# Patient Record
Sex: MALE | Race: WHITE
[De-identification: names, ages, dates, MRNs, and addresses within clinical notes are randomized per-mention and may not be internally consistent; named-entity substitution may affect disease eponyms.]

---

## 2018-03-20 ENCOUNTER — HOSPITAL ENCOUNTER (OUTPATIENT)
Dept: HOSPITAL 84 - D.CATH | Age: 63
Discharge: HOME | End: 2018-03-20
Attending: INTERNAL MEDICINE
Payer: COMMERCIAL

## 2018-03-20 VITALS — HEIGHT: 70 IN | BODY MASS INDEX: 36.73 KG/M2 | WEIGHT: 256.54 LBS

## 2018-03-20 VITALS — DIASTOLIC BLOOD PRESSURE: 80 MMHG | SYSTOLIC BLOOD PRESSURE: 138 MMHG

## 2018-03-20 DIAGNOSIS — Z01.812: ICD-10-CM

## 2018-03-20 DIAGNOSIS — I25.119: Primary | ICD-10-CM

## 2018-03-20 DIAGNOSIS — I10: ICD-10-CM

## 2018-03-20 LAB
ANION GAP SERPL CALC-SCNC: 12.9 MMOL/L (ref 8–16)
BASOPHILS NFR BLD AUTO: 0.8 % (ref 0–2)
BUN SERPL-MCNC: 18 MG/DL (ref 7–18)
CALCIUM SERPL-MCNC: 9.5 MG/DL (ref 8.5–10.1)
CHLORIDE SERPL-SCNC: 104 MMOL/L (ref 98–107)
CO2 SERPL-SCNC: 22.2 MMOL/L (ref 21–32)
CREAT SERPL-MCNC: 0.9 MG/DL (ref 0.6–1.3)
EOSINOPHIL NFR BLD: 3.5 % (ref 0–7)
ERYTHROCYTE [DISTWIDTH] IN BLOOD BY AUTOMATED COUNT: 13 % (ref 11.5–14.5)
GLUCOSE SERPL-MCNC: 222 MG/DL (ref 74–106)
HCT VFR BLD CALC: 42 % (ref 42–54)
HGB BLD-MCNC: 14.5 G/DL (ref 13.5–17.5)
IMM GRANULOCYTES NFR BLD: 0.5 % (ref 0–5)
LYMPHOCYTES NFR BLD AUTO: 20.2 % (ref 15–50)
MCH RBC QN AUTO: 28.9 PG (ref 26–34)
MCHC RBC AUTO-ENTMCNC: 34.5 G/DL (ref 31–37)
MCV RBC: 83.8 FL (ref 80–100)
MONOCYTES NFR BLD: 10 % (ref 2–11)
NEUTROPHILS NFR BLD AUTO: 65 % (ref 40–80)
OSMOLALITY SERPL CALC.SUM OF ELEC: 278 MOSM/KG (ref 275–300)
PLATELET # BLD: 289 10X3/UL (ref 130–400)
PMV BLD AUTO: 10.1 FL (ref 7.4–10.4)
POTASSIUM SERPL-SCNC: 4.1 MMOL/L (ref 3.5–5.1)
RBC # BLD AUTO: 5.01 10X6/UL (ref 4.2–6.1)
SODIUM SERPL-SCNC: 135 MMOL/L (ref 136–145)
WBC # BLD AUTO: 7.5 10X3/UL (ref 4.8–10.8)

## 2018-03-28 ENCOUNTER — HOSPITAL ENCOUNTER (OUTPATIENT)
Dept: HOSPITAL 84 - D.CATH | Age: 63
Discharge: HOME | End: 2018-03-28
Attending: INTERNAL MEDICINE
Payer: COMMERCIAL

## 2018-03-28 VITALS — BODY MASS INDEX: 36.44 KG/M2 | WEIGHT: 254.53 LBS | HEIGHT: 70 IN

## 2018-03-28 VITALS — SYSTOLIC BLOOD PRESSURE: 133 MMHG | DIASTOLIC BLOOD PRESSURE: 79 MMHG

## 2018-03-28 DIAGNOSIS — Z01.812: ICD-10-CM

## 2018-03-28 DIAGNOSIS — I10: ICD-10-CM

## 2018-03-28 DIAGNOSIS — Z95.5: ICD-10-CM

## 2018-03-28 DIAGNOSIS — E78.5: ICD-10-CM

## 2018-03-28 DIAGNOSIS — I25.119: Primary | ICD-10-CM

## 2018-03-28 LAB
ANION GAP SERPL CALC-SCNC: 15.5 MMOL/L (ref 8–16)
BASOPHILS NFR BLD AUTO: 0.8 % (ref 0–2)
BUN SERPL-MCNC: 13 MG/DL (ref 7–18)
CALCIUM SERPL-MCNC: 9.2 MG/DL (ref 8.5–10.1)
CHLORIDE SERPL-SCNC: 105 MMOL/L (ref 98–107)
CO2 SERPL-SCNC: 21.7 MMOL/L (ref 21–32)
CREAT SERPL-MCNC: 1.1 MG/DL (ref 0.6–1.3)
EOSINOPHIL NFR BLD: 3.7 % (ref 0–7)
ERYTHROCYTE [DISTWIDTH] IN BLOOD BY AUTOMATED COUNT: 13.1 % (ref 11.5–14.5)
GLUCOSE SERPL-MCNC: 229 MG/DL (ref 74–106)
HCT VFR BLD CALC: 43 % (ref 42–54)
HGB BLD-MCNC: 15 G/DL (ref 13.5–17.5)
IMM GRANULOCYTES NFR BLD: 0.6 % (ref 0–5)
LYMPHOCYTES NFR BLD AUTO: 20.5 % (ref 15–50)
MCH RBC QN AUTO: 29.2 PG (ref 26–34)
MCHC RBC AUTO-ENTMCNC: 34.9 G/DL (ref 31–37)
MCV RBC: 83.8 FL (ref 80–100)
MONOCYTES NFR BLD: 8.9 % (ref 2–11)
NEUTROPHILS NFR BLD AUTO: 65.5 % (ref 40–80)
OSMOLALITY SERPL CALC.SUM OF ELEC: 282 MOSM/KG (ref 275–300)
PLATELET # BLD: 312 10X3/UL (ref 130–400)
PMV BLD AUTO: 10 FL (ref 7.4–10.4)
POTASSIUM SERPL-SCNC: 4.2 MMOL/L (ref 3.5–5.1)
RBC # BLD AUTO: 5.13 10X6/UL (ref 4.2–6.1)
SODIUM SERPL-SCNC: 138 MMOL/L (ref 136–145)
WBC # BLD AUTO: 8.9 10X3/UL (ref 4.8–10.8)

## 2018-05-24 ENCOUNTER — HOSPITAL ENCOUNTER (INPATIENT)
Dept: HOSPITAL 84 - D.ER | Age: 63
LOS: 1 days | Discharge: HOME | DRG: 392 | End: 2018-05-25
Attending: INTERNAL MEDICINE | Admitting: INTERNAL MEDICINE
Payer: COMMERCIAL

## 2018-05-24 VITALS
WEIGHT: 252 LBS | WEIGHT: 252 LBS | BODY MASS INDEX: 36.08 KG/M2 | HEIGHT: 70 IN | BODY MASS INDEX: 36.08 KG/M2 | BODY MASS INDEX: 36.08 KG/M2 | BODY MASS INDEX: 36.08 KG/M2 | HEIGHT: 70 IN

## 2018-05-24 DIAGNOSIS — K57.32: Primary | ICD-10-CM

## 2018-05-24 DIAGNOSIS — I10: ICD-10-CM

## 2018-05-24 DIAGNOSIS — E66.9: ICD-10-CM

## 2018-05-24 DIAGNOSIS — E11.9: ICD-10-CM

## 2018-05-24 LAB
ALBUMIN SERPL-MCNC: 4 G/DL (ref 3.4–5)
ALP SERPL-CCNC: 56 U/L (ref 46–116)
ALT SERPL-CCNC: 71 U/L (ref 10–68)
AMYLASE SERPL-CCNC: 57 U/L (ref 25–115)
ANION GAP SERPL CALC-SCNC: 16.3 MMOL/L (ref 8–16)
APPEARANCE UR: CLEAR
BASOPHILS NFR BLD AUTO: 0.6 % (ref 0–2)
BILIRUB SERPL-MCNC: 0.5 MG/DL (ref 0.2–1.3)
BILIRUB SERPL-MCNC: NEGATIVE MG/DL
BUN SERPL-MCNC: 13 MG/DL (ref 7–18)
CALCIUM SERPL-MCNC: 9.5 MG/DL (ref 8.5–10.1)
CHLORIDE SERPL-SCNC: 103 MMOL/L (ref 98–107)
CK SERPL-CCNC: 78 UL (ref 21–232)
CO2 SERPL-SCNC: 21.8 MMOL/L (ref 21–32)
COLOR UR: YELLOW
CREAT SERPL-MCNC: 0.8 MG/DL (ref 0.6–1.3)
CRP SERPL-MCNC: 1.7 MG/DL (ref 0–0.9)
EOSINOPHIL NFR BLD: 2.2 % (ref 0–7)
ERYTHROCYTE [DISTWIDTH] IN BLOOD BY AUTOMATED COUNT: 13.2 % (ref 11.5–14.5)
GLOBULIN SER-MCNC: 3.8 G/L
GLUCOSE SERPL-MCNC: 1000 MG/DL
GLUCOSE SERPL-MCNC: 223 MG/DL (ref 74–106)
HCT VFR BLD CALC: 43.4 % (ref 42–54)
HGB BLD-MCNC: 15.4 G/DL (ref 13.5–17.5)
IMM GRANULOCYTES NFR BLD: 0.3 % (ref 0–5)
INR PPP: 1 (ref 0.85–1.17)
KETONES UR STRIP-MCNC: (no result) MG/DL
LIPASE SERPL-CCNC: 215 U/L (ref 73–393)
LYMPHOCYTES NFR BLD AUTO: 12.9 % (ref 15–50)
MAGNESIUM SERPL-MCNC: 2.1 MG/DL (ref 1.8–2.4)
MCH RBC QN AUTO: 29.7 PG (ref 26–34)
MCHC RBC AUTO-ENTMCNC: 35.5 G/DL (ref 31–37)
MCV RBC: 83.6 FL (ref 80–100)
MONOCYTES NFR BLD: 10.7 % (ref 2–11)
NEUTROPHILS NFR BLD AUTO: 73.3 % (ref 40–80)
NITRITE UR-MCNC: NEGATIVE MG/ML
NT-PROBNP SERPL-MCNC: 36 PG/ML (ref 0–125)
OSMOLALITY SERPL CALC.SUM OF ELEC: 280 MOSM/KG (ref 275–300)
PH UR STRIP: 5 [PH] (ref 5–6)
PLATELET # BLD: 309 10X3/UL (ref 130–400)
PMV BLD AUTO: 10.5 FL (ref 7.4–10.4)
POTASSIUM SERPL-SCNC: 4.1 MMOL/L (ref 3.5–5.1)
PROT SERPL-MCNC: 7.8 G/DL (ref 6.4–8.2)
PROT UR-MCNC: NEGATIVE MG/DL
PROTHROMBIN TIME: 12.8 SECONDS (ref 11.6–15)
RBC # BLD AUTO: 5.19 10X6/UL (ref 4.2–6.1)
SODIUM SERPL-SCNC: 137 MMOL/L (ref 136–145)
SP GR UR STRIP: 1.01 (ref 1–1.02)
TROPONIN I SERPL-MCNC: < 0.017 NG/ML (ref 0–0.06)
UROBILINOGEN UR-MCNC: NORMAL MG/DL
WBC # BLD AUTO: 13.4 10X3/UL (ref 4.8–10.8)

## 2018-05-25 VITALS — SYSTOLIC BLOOD PRESSURE: 135 MMHG | DIASTOLIC BLOOD PRESSURE: 92 MMHG

## 2018-05-25 VITALS — DIASTOLIC BLOOD PRESSURE: 83 MMHG | SYSTOLIC BLOOD PRESSURE: 146 MMHG

## 2018-05-25 VITALS — DIASTOLIC BLOOD PRESSURE: 79 MMHG | SYSTOLIC BLOOD PRESSURE: 147 MMHG

## 2018-05-25 VITALS — SYSTOLIC BLOOD PRESSURE: 141 MMHG | DIASTOLIC BLOOD PRESSURE: 78 MMHG

## 2018-05-25 VITALS — SYSTOLIC BLOOD PRESSURE: 101 MMHG | DIASTOLIC BLOOD PRESSURE: 55 MMHG

## 2018-05-25 VITALS — DIASTOLIC BLOOD PRESSURE: 72 MMHG | SYSTOLIC BLOOD PRESSURE: 148 MMHG

## 2018-05-25 LAB
ALBUMIN SERPL-MCNC: 3.6 G/DL (ref 3.4–5)
ALP SERPL-CCNC: 52 U/L (ref 46–116)
ALT SERPL-CCNC: 61 U/L (ref 10–68)
ANION GAP SERPL CALC-SCNC: 15.4 MMOL/L (ref 8–16)
BASOPHILS NFR BLD AUTO: 0.4 % (ref 0–2)
BILIRUB SERPL-MCNC: 1 MG/DL (ref 0.2–1.3)
BUN SERPL-MCNC: 11 MG/DL (ref 7–18)
CALCIUM SERPL-MCNC: 9 MG/DL (ref 8.5–10.1)
CHLORIDE SERPL-SCNC: 102 MMOL/L (ref 98–107)
CO2 SERPL-SCNC: 24.6 MMOL/L (ref 21–32)
CREAT SERPL-MCNC: 1 MG/DL (ref 0.6–1.3)
EOSINOPHIL NFR BLD: 2.5 % (ref 0–7)
ERYTHROCYTE [DISTWIDTH] IN BLOOD BY AUTOMATED COUNT: 13.3 % (ref 11.5–14.5)
GLOBULIN SER-MCNC: 3.8 G/L
GLUCOSE SERPL-MCNC: 197 MG/DL (ref 74–106)
HCT VFR BLD CALC: 42.4 % (ref 42–54)
HGB BLD-MCNC: 14.8 G/DL (ref 13.5–17.5)
IMM GRANULOCYTES NFR BLD: 0.5 % (ref 0–5)
LYMPHOCYTES NFR BLD AUTO: 17.7 % (ref 15–50)
MCH RBC QN AUTO: 29.3 PG (ref 26–34)
MCHC RBC AUTO-ENTMCNC: 34.9 G/DL (ref 31–37)
MCV RBC: 84 FL (ref 80–100)
MONOCYTES NFR BLD: 7.7 % (ref 2–11)
NEUTROPHILS NFR BLD AUTO: 71.2 % (ref 40–80)
OSMOLALITY SERPL CALC.SUM OF ELEC: 279 MOSM/KG (ref 275–300)
PLATELET # BLD: 297 10X3/UL (ref 130–400)
PMV BLD AUTO: 10.6 FL (ref 7.4–10.4)
POTASSIUM SERPL-SCNC: 4 MMOL/L (ref 3.5–5.1)
PROT SERPL-MCNC: 7.4 G/DL (ref 6.4–8.2)
RBC # BLD AUTO: 5.05 10X6/UL (ref 4.2–6.1)
SODIUM SERPL-SCNC: 138 MMOL/L (ref 136–145)
WBC # BLD AUTO: 11.1 10X3/UL (ref 4.8–10.8)

## 2018-12-04 ENCOUNTER — HOSPITAL ENCOUNTER (OUTPATIENT)
Dept: HOSPITAL 84 - D.CATH | Age: 63
Discharge: HOME | End: 2018-12-04
Attending: INTERNAL MEDICINE
Payer: COMMERCIAL

## 2018-12-04 VITALS — DIASTOLIC BLOOD PRESSURE: 71 MMHG | SYSTOLIC BLOOD PRESSURE: 129 MMHG

## 2018-12-04 VITALS — BODY MASS INDEX: 35.15 KG/M2 | HEIGHT: 70 IN | BODY MASS INDEX: 35.15 KG/M2 | WEIGHT: 245.52 LBS

## 2018-12-04 DIAGNOSIS — I25.119: Primary | ICD-10-CM

## 2018-12-04 DIAGNOSIS — Z01.812: ICD-10-CM

## 2018-12-04 LAB
ANION GAP SERPL CALC-SCNC: 14.9 MMOL/L (ref 8–16)
BASOPHILS NFR BLD AUTO: 1.9 % (ref 0–2)
BUN SERPL-MCNC: 17 MG/DL (ref 7–18)
CALCIUM SERPL-MCNC: 9.3 MG/DL (ref 8.5–10.1)
CHLORIDE SERPL-SCNC: 105 MMOL/L (ref 98–107)
CO2 SERPL-SCNC: 24.2 MMOL/L (ref 21–32)
CREAT SERPL-MCNC: 0.9 MG/DL (ref 0.6–1.3)
EOSINOPHIL NFR BLD: 4.7 % (ref 0–7)
ERYTHROCYTE [DISTWIDTH] IN BLOOD BY AUTOMATED COUNT: 13.1 % (ref 11.5–14.5)
GLUCOSE SERPL-MCNC: 197 MG/DL (ref 74–106)
HCT VFR BLD CALC: 43.8 % (ref 42–54)
HGB BLD-MCNC: 14.9 G/DL (ref 13.5–17.5)
IMM GRANULOCYTES NFR BLD: 0.7 % (ref 0–5)
LYMPHOCYTES NFR BLD AUTO: 21.3 % (ref 15–50)
MCH RBC QN AUTO: 28.8 PG (ref 26–34)
MCHC RBC AUTO-ENTMCNC: 34 G/DL (ref 31–37)
MCV RBC: 84.6 FL (ref 80–100)
MONOCYTES NFR BLD: 9.2 % (ref 2–11)
NEUTROPHILS NFR BLD AUTO: 62.2 % (ref 40–80)
OSMOLALITY SERPL CALC.SUM OF ELEC: 285 MOSM/KG (ref 275–300)
PLATELET # BLD: 381 10X3/UL (ref 130–400)
PMV BLD AUTO: 10 FL (ref 7.4–10.4)
POTASSIUM SERPL-SCNC: 4.1 MMOL/L (ref 3.5–5.1)
RBC # BLD AUTO: 5.18 10X6/UL (ref 4.2–6.1)
SODIUM SERPL-SCNC: 140 MMOL/L (ref 136–145)
WBC # BLD AUTO: 7.3 10X3/UL (ref 4.8–10.8)

## 2019-04-15 ENCOUNTER — HOSPITAL ENCOUNTER (OUTPATIENT)
Dept: HOSPITAL 84 - D.CATH | Age: 64
Discharge: HOME | End: 2019-04-15
Payer: COMMERCIAL

## 2019-04-15 VITALS — BODY MASS INDEX: 35.2 KG/M2

## 2019-04-15 VITALS — DIASTOLIC BLOOD PRESSURE: 78 MMHG | SYSTOLIC BLOOD PRESSURE: 137 MMHG

## 2019-04-15 DIAGNOSIS — Z01.812: ICD-10-CM

## 2019-04-15 DIAGNOSIS — I25.119: Primary | ICD-10-CM

## 2019-04-15 LAB
ANION GAP SERPL CALC-SCNC: 14.8 MMOL/L (ref 8–16)
BASOPHILS NFR BLD AUTO: 0.8 % (ref 0–2)
BUN SERPL-MCNC: 18 MG/DL (ref 7–18)
CALCIUM SERPL-MCNC: 8.9 MG/DL (ref 8.5–10.1)
CHLORIDE SERPL-SCNC: 107 MMOL/L (ref 98–107)
CO2 SERPL-SCNC: 23.7 MMOL/L (ref 21–32)
CREAT SERPL-MCNC: 0.7 MG/DL (ref 0.6–1.3)
EOSINOPHIL NFR BLD: 4.6 % (ref 0–7)
ERYTHROCYTE [DISTWIDTH] IN BLOOD BY AUTOMATED COUNT: 13.7 % (ref 11.5–14.5)
GLUCOSE SERPL-MCNC: 205 MG/DL (ref 74–106)
HCT VFR BLD CALC: 43 % (ref 42–54)
HGB BLD-MCNC: 15.1 G/DL (ref 13.5–17.5)
IMM GRANULOCYTES NFR BLD: 0.5 % (ref 0–5)
LYMPHOCYTES NFR BLD AUTO: 21.3 % (ref 15–50)
MCH RBC QN AUTO: 29.1 PG (ref 26–34)
MCHC RBC AUTO-ENTMCNC: 35.1 G/DL (ref 31–37)
MCV RBC: 82.9 FL (ref 80–100)
MONOCYTES NFR BLD: 7.8 % (ref 2–11)
NEUTROPHILS NFR BLD AUTO: 65 % (ref 40–80)
OSMOLALITY SERPL CALC.SUM OF ELEC: 288 MOSM/KG (ref 275–300)
PLATELET # BLD: 293 10X3/UL (ref 130–400)
PMV BLD AUTO: 10.1 FL (ref 7.4–10.4)
POTASSIUM SERPL-SCNC: 4.5 MMOL/L (ref 3.5–5.1)
RBC # BLD AUTO: 5.19 10X6/UL (ref 4.2–6.1)
SODIUM SERPL-SCNC: 141 MMOL/L (ref 136–145)
WBC # BLD AUTO: 8.6 10X3/UL (ref 4.8–10.8)

## 2019-04-15 NOTE — HEMODYNAMI
PATIENT:JOSE ESTRADA                             MEDICAL RECORD: [id number]
: 57                                            LOCATION:D.CAT          
ACCT# [id number]                                       ADMISSION DATE: 04/15/19
 
 
 Generatedon:4/15/133042:59
Patient name: JOSE ESTRADA Patient #: [id number] Visit #: [id number] SSN: 
: 1955 Date of
study: 4/15/2019
Page: Of
Hemodynamic Procedure Report
****************************
Patient Data
Patient Demographics
Procedure consent was obtained
First Name: JOSE            Gender: Male
Last Name: NATALIE         : 1955
Middle Initial: DELMAR      Age: 64 year(s)
Patient #: [id number]       Race: Unknown
Visit #: [id number]
Accession #:
[id number]
Additional ID: [id number]
Contact details
Address: [address] [place] [address]       Phone: [phone]
State: [place]
City: [place]
Zip code: [address]
Past Medical History
Allergies
Allergen        Reaction        Date         Comments
Reported
Other allergy                   3/20/2018    Codeine, Statins,
Talwin
Other allergy                   3/28/2018    Talwin, Codeine,
Statins
Other allergy                   4/15/2019    Codeine, statin,
talwin
Admission
Admission Data
Admission Date: 4/15/2019   Admission Time: 9:53
Height (in.): 62            BSA: 2.11 (m2)
Height (cm.): 157.48        BMI: 45.91 (kg/m2)
Weight (lbs.): 251
Weight (kg.): 113.85
Lab Results
Lab Result Date: 4/15/2019  Lab Result Time: 0:00
Biochemistry
Name         Units    Result                Min      Max
BUN          mg/dl    28       --(----)-*   7        18
Creatinine   mg/dl    0.9      --(-*--)--   0.6      1.3
CBC
Name         Units    Result                Min      Max
Hemoglobin   g/dl     13       -*(----)--   13.5     17.5
Procedure
Procedure Types
Cath Procedure
Diagnostic Procedure
LHC
 
LHC w/Coronaries
FFR/IVUS
Intra-Coronary IVUS Initial
Sedation Charges
Moderate Sedation up to 15 minutes
PCI Procedure
Coronary Stent
Coronary Stent Initial x2
Procedure Description
Procedure Date
Procedure Date: 4/15/2019
Procedure Start Time: 12:34
Procedure End Time: 12:54
Procedure Staff
Name                            Function
Brianne Jim RT             Scrub
Wilton Robbins RN                Nurse
Pratik Becerra MD                Performing Physician
Citlaly Bhat RT               Monitor
Procedure Data
Cath Procedure
Fluoroscopy
Diagnostic fluoroscopy      Total fluoroscopy Time: 6.5
time: 6.5 min               min
Diagnostic fluoroscopy      Total fluoroscopy dose: 573
dose: 573 mGy               mGy
Contrast Material
Contrast Material Type                       Amount (ml)
Isovue 300                                   112
Entry Location
Entry     Primary  Successful  Side  Size  Upsize Upsize Entry    Closure     Schmidt
ccessful  Closure
Location                             (Fr)  1 (Fr) 2 (Fr) Remarks  Device        
          Remarks
Radial                         Right 6 Fr                         Mechanical    
          TR
artery                               Short                        Compression
Estimated blood loss: 10 ml
Diagnostic catheters
Device Type               Used For           End Catheter
Placement
DIAGNOSTIC [place] 110cm 5  Procedure
Fr catheter (218939)
Procedure Complications
No complications
Procedure Medications
Medication           Administration Route Dosage
Oxygen               etCO2 Nasal cannula  2 l/min
Heparin Flush Bag    added to field       2 bags
(1000units/500ml NS)
0.9% NaCl            I.V.                 100 ml/hr
Lidocaine 2%         added to field       20
Radial Cocktail      added to field       1 syringe
(Verapomil 2mg/Nitro
400mcg/Heparin
1500units)
Fentanyl             I.V.                 50 mcg
Versed               I.V.                 1 mg
Fentanyl             I.V.                 50 mcg
Versed               I.V.                 1 mg
 
Fentanyl             I.V.                 50 mcg
Versed               I.V.                 1 mg
Fentanyl             I.V.                 50 mcg
Versed               I.V.                 1 mg
Heparin Bolus        I.V.                 4000 units
Hemodynamics
Rest
BSA: 2.11 (m2) HGB: 13 (g/dl) O2 Consumption: Estimated: 286.96 (ml/min) O2 Cons
umption indexed:
Estimated:136 (ml/min/m)
Pre Cath      Intra         NCS           Post Cath
Vital Signs
Time     Heart  Resp   SPO2 etCO2   NIBP (mmHg) Rhythm  Pain    Sedation
Rate   (ipm)  (%)  (mmHg)                      Status  Level
(bpm)
12:18:31 67     17     96   31.5    146/87(115) NSR     0 (11)  10(A)
, No
pain
12:22:45 73     17     91   36      133/83(101) NSR     0 (11)  10(A)
, No
pain
12:26:57 69     17     89   36.8    132/77(101) NSR     0 (11)  10(A)
, No
pain
12:31:09 71     16     90   37.6    125/80(103) NSR     0 (11)  10(A)
, No
pain
12:35:16 76     16     89   27      119/78(96)  NSR     0 (11)  9(A)
, No
pain
12:39:24 89     16     85   15.7    128/78(101) NSR     0 (11)  9(A)
, No
pain
12:43:32 90     16     86   26.2    133/79(106) NSR     0 (11)  9(A)
, No
pain
12:47:42 87     17     87   27      137/83(105) NSR     0 (11)  9(A)
, No
pain
12:51:54 84     16     89   33.8    136/84(106) NSR     0 (11)  9(A)
, No
pain
12:53:51 81     17     90   34.5    130/85(100) NSR     0 (11)  9(A)
, No
pain
Medications
Time     Medication       Route   Dose    Verified Delivered Reason          Not
es  Effectiveness
by       by
12:18:06 Oxygen           etCO2   2 l/min Pratik Núñez    Per physician
Nasal           Hernan Robbins RN
cannula
12:18:13 Heparin Flush    added   2 bags  Pratikfawn Rubioy    used for
Bag              to              Hernan Robbins RN procedure
(1000units/500ml field
NS)
12:18:22 0.9% NaCl        I.V.    100     Pratik  Wilton    Per physician
ml/hr   Hernan Robbins RN
12:18:31 Lidocaine 2%     added   20ml    Pratik  Wilton    used for
to      vial    Hernan Robbins RN procedure
 
field
12:18:41 Radial Cocktail  added   1       Pratik  Wilton    used for
(Verapomil       to      syringe Hernan Robbins RN procedure
2mg/Nitro        field
400mcg/Hepari
12:26:47 Fentanyl         I.V.    50 mcg  Pratik Rubioy    for sedation
Hernan Robbins RN
12:26:53 Versed           I.V.    1 mg    Pratikfawn Rubioy    for sedation
Hernan Robbins RN
12:30:33 Fentanyl         I.V.    50 mcg  Pratik  Wilton    for sedation
Hernan Robbins RN
12:30:39 Versed           I.V.    1 mg    Pratikfawn Rubioy    for sedation
Hernan Robbins RN
12:33:28 Fentanyl         I.V.    50 mcg  Pratik Rubioy    for sedation
Tauth MD Robbins RN
12:34:36 Versed           I.V.    1 mg    Praitk Núñez    for sedation
Hernan Robbins RN
12:37:04 Fentanyl         I.V.    50 mcg  Pratik Núñez    for sedation
Hernan Robbins RN
12:37:10 Versed           I.V.    1 mg    Pratik Núñez    for sedation
Hernan Robbins RN
12:42:06 Heparin Bolus    I.V.    4000    Pratik Núñez    for
units   Hernan Robbins RN anticoagulation
Procedure Log
Time     Note
12:00:09 Wilton Robbins RN sent for patient. Start room use.
12:05:10 Time tracking: Regular hours (M-F 7:00 - 5:00)
12:05:14 Plan of Care:Hemodynamics will remain stable., Cardiac
rhythm will remain stable., Comfort level will be
maintained., Respiratory function will remain
adequate., Patient/ family verbilizes understanding of
procedure., Procedure tolerated without complication.,
Recovers from procedure without complications..
12:09:07 Patient received from Pre/Post Procedure Room to CCL 3
Alert and oriented. Tansferred to table in Supine
position.
12:09:08 Correct patient and procedure confirmed by team.
12:09:08 Warm blankets applied, and jovana hugger turned on for
patient comfort.
12:09:10 ECG and BP/O2 sat monitors applied to patient.
12:09:10 Signed procedure consent form obtained from patient.
12:09:11 Full Disclosure recording started
12:17:16 H&P Date Dictated: 2019 Within 30 days and on
chart., H&P Addendum completed by physician on day of
procedure. (MUST COMPLETE FOR ALL OUTPATIENTS).
12:17:17 Pre-procedure instructions explained to patient.
12:17:19 Family in waiting room.
12:17:22 Patient NPO since Midnight.
12:17:25 Vital chart was started
12:17:52 Patient allergic to Other allergyCodeine, statin,
talwin
12:18:00 Is patient on blood thinner?Yes
12:18:03 **ACC** The patient was administered the following
blood thiners within the last 24 hours:
**ACC**Aspirin, **ACC**Plavix
12:18:06 Patient diabetic? Yes.
12:18:06 Oxygen 2 l/min etCO2 Nasal cannula was administered by
Wilton Robbins RN; Per physician;
12:18:08 If diabetic: On Metformin? Yes
12:18:10 If on Metformin: Last Dose? 2019
 
12:18:13 Heparin Flush Bag (1000units/500ml NS) 2 bags added to
field was administered by Wilton Robbins RN; used for
procedure;
12:18:21 Previous problem with sedation/anesthesia? No ?
12:18:22 0.9% NaCl 100 ml/hr I.V. was administered by Wilton Robbins RN; Per physician;
12:18:31 Lidocaine 2% 20ml vial added to field was administered
by Wilton Robbins RN; used for procedure;
12:18:33 Snore? Yes
12:18:37 Sleep apnea? Yes
12:18:41 Radial Cocktail (Verapomil 2mg/Nitro 400mcg/Heparin
1500units) 1 syringe added to field was administered
by Wilton Robbins RN; used for procedure;
12:18:43 Dentures? No ?
12:18:46 Patient pain scale 0/10 ?.
12:19:13 IV patent on arrival in left forearm with 0.9% NaCl at
[facility].
12:21:30 Lab Result : Hemoglobin 13 g/dl
12:21:30 Lab Result : Creatinine 0.9 mg/dl
12:21:30 Lab Result : BUN 28 mg/dl
12:21:34 Lab results completed and on chart.
12:24:07 Right Radial & Right Groin area was prepped with
chlora-prep and draped in sterile fashion
12:24:25 Alarms reviewed by R. N.
12:24:27 Sharps counted by scrub and verified by R.N.
12:25:18 Right Radial & Right Groin site verified by team.
12:25:57 Maximum allowable Isovue 300 dose 300ml. Physician
notified. (300ml for normal creatinines. For patients
with creatinine of 1.7 or higher multiply weight(kg) x
5 divided by creatinine.)
12:26:20 Fire Safety Assessment: A--An alcohol-based skin
anteseptic being used preoperatively., C--Open oxygen
or nitrous oxide is being used., D--An ESU, laser, or
fiber-optic light is being used.
12:26:24 Physical assessment completed. ASA score P 2 - A
patient with mild systemic disease as per Pratik Becerra MD.
12:26: Sedation plan: IV Moderate Sedation Medication:Versed,
Fentanyl
12::31 Physician arrived
12::32 --------ALL STOP TIME OUT------
::33 Final Timeout: patient, procedure, and site verified
with staff and physician. All members of the team are
in agreement.
12:26:46 Use device set Radial Dx or PCI
12::47 Fentanyl 50 mcg I.V. was administered by Wilton Robbins
RN; for sedation;
12:26:48 ACIST Syringe (68728) opened to sterile field.
12:26:49 Medline Cath Pack ([id number]) opened to sterile field.
12:26:50 DIAGNOSTIC WIRE .035 260cm J wire (855045) opened to
sterile field.
12:26:50 Bag Decanter (2002S) opened to sterile field.
12:26:51 ACIST Hand Control (32371) opened to sterile field.
12:26:52 Tegaderm 4 x 4 (1626W) opened to sterile field.
12:26:52 ACIST Manifold (95852) opened to sterile field.
12:26:53 Versed 1 mg I.V. was administered by Wilton Robbins RN;
for sedation;
12:26:55 MBrace Wrist Support ([id number]) opened to sterile
field.
12:26:59 SHEATH 6FR Slender () opened to sterile field.
 
12:29:16 Patient Height : 62 inches
12:29:20 Patient Weight : 251 lbs
12:30:33 Fentanyl 50 mcg I.V. was administered by Wilton Robbins
RN; for sedation;
12:30:39 Versed 1 mg I.V. was administered by Wilton Robbins RN;
for sedation;
12:33:28 Fentanyl 50 mcg I.V. was administered by Wilton Robbins
RN; for sedation;
12:33:32 Procedure started.
12:34:24 Local anesthetic to right radial artery with Lidocaine
2% by Pratik Becerra MD.**INITIAL ACCESS ONLY**
12:34:36 Versed 1 mg I.V. was administered by Wilton Robbins RN;
for sedation;
12:35:27 A 6 Fr Short sheath was inserted into the Right Radial
artery
12:35:39 A DIAGNOSTIC Tiger 110cm 5 Fr catheter (745331) was
advanced over the wire and used for Procedure.
12:35:46 LV angiography performed.
12:36:26 LV gram done using GOODE
12:37:04 Fentanyl 50 mcg I.V. was administered by Wilton Robbins
RN; for sedation;
12:37:10 Versed 1 mg I.V. was administered by Wilton Robbins RN;
for sedation;
12:37:12 EF : 60 %
12:37:23 RCA angiography performed.
12:38:42 Catheter removed.
12:39:04 GUIDE 6FR XBLAD 4.0 catheter ([id number]) opened to
sterile field.
12:39:34 LCA angiography performed.
12:40:34 Volcano Platinum Eagleye IVUS Catheter (94840U) opened
to sterile field.
12:40:35 INFLATOR Merit BasixCompak (BC2517) opened to sterile
field.
12:40:36 CHOICE PT Extra Support 182cm wire ([id number]) opened
to sterile field.
12:42:06 Heparin Bolus 4000 units I.V. was administered by
Wilton Robbins RN; for anticoagulation;
12:42:19 choice pt ex wire advanced.
12:42:21 Wire advanced across lesion.
12:42:22 IVUS catheter advanced over wire.
12:42:48 IVUS catheter removed over wire.
12:44:24 Place stent Inflation Number: 1 A DANDRE RX 3.5 x 22
stent ([id number]) was prepped and advanced across
the Prox LAD. The stent was deployed at 17 GRICELDA for
0:05 (min:sec).
12:44:50 Stent catheter was removed intact over wire.
12:45:56 Wire redirected to ramus.
12:48:46 Inflate balloon Inflation number: 1 A EUPHORA 2.0 x 15
Balloon ([id number]) was prepped and advanced across the
Ramus, then inflated to 0 GRICELDA for 0:07 (min:sec).
12:49:31 Balloon removed over the wire.
12:50:28 Place stent Inflation Number: 2 A DANDRE RX 2.5 x 18
stent ([id number]) was prepped and advanced across
the Ramus. The stent was deployed at 11 GRICELDA for 0:04
(min:sec).
12:50:55 TR BAND Standard ([id number]) opened to sterile field.
12:50:58 Guide catheter removed.
12:51:17 Sheath removed intact; hemostasis achieved with
Mechanical Compression to the Right Radial artery.
12:51:19 Procedure ended.(Physican Out)
 
12:51:36 Fluoroscopy time 06.50 minutes.
12:51:41 Fluoroscopy dose: 573 mGy
12:51:41 Flurop Dose total: 573
12:51:49 Contrast amount:Isovue 300 112ml.
12:51:51 Sharps counted by scrub and verified by R.N.
12:53:04 TR band inflated with 13cc of air.
12:53:09 Insertion/operative site no bleeding no hematoma.
12:53:11 Post Procedure Pulses reassessed and unchanged
12:53:30 Post-procedure physical assessment completed. ASA
score P 2 - A patient with mild systemic disease as
per Pratik Becerra MD.
12:53:33 Post procedure rhythm: sinus rhythm
12:53:43 Estimated blood loss: 10 ml
12:53:44 Post procedure instruction explained to
patient.Patient verbalizes understanding.
12:53:46 Patient needs reinforcement of post procedure
teaching.
12:54:07 Procedure type changed to Cath procedure, Diagnostic
procedure, LHC, LHC w/Coronaries, FFR/IVUS,
Intra-Coronary IVUS Initial, Sedation Charges,
Moderate Sedation up to 15 minutes, PCI procedure,
Coronary Stent, Coronary Stent Initial x2
12:54:08 Procedure and supply charges have been captured,
reviewed, submitted and are correct.
12:54:29 Procedure Complication : No complications
12:54:33 Vital chart was stopped
12:54:38 See physician's report for complete and final results.
12:54:40 Report given to Pre/Post Procedure Room.
12:54:43 Patient transfered to Pre/Post Procedure Room with
Stretcher.
12:54:46 Full Disclosure recording stopped
12:54:46 Procedure ended.
12:55:03 **ACC-PCI Only** Patient was given prescriptions, or
instructed by Pratik Becerra MD to start/continue the
following medications upon discharge: Aspirin, Plavix
12:55:05 End room use (Document Last)
Intervention Summary
Intervention Notes
Time     ActionType  Lesion and  Equipment Used Action#  Pressure  Duration
Attributes
12:44:24 Place stent Prox LAD    DANDRE RX 3.5 x  1        17        00:05
22 stent
([id number])
12:48:46 Inflate     Ramus       EUPHORA 2.0 x  1        0         00:07
balloon                 15 Balloon
([id number])
12:50:28 Place stent Ramus       DANDRE RX 2.5 x  2        11        00:04
18 stent
([id number])
Device Usage
Item Name      Manufacture  Quantity  Catalog Number Hospital Part     Current M
inimal Lot# /
Charge   Number   Stock   Stock   Serial#
Code
ACIST Syringe  Acist        1         68760          916675   071496   136368  2
0
(37170)        Medical
Systems Inc
Medline Cath   Medline      1         [id number]      574770   27976    395222  5
Pack
 
([id number])
Bag Decanter   Microtek     1         S          906567   24998    794513  5
(S)        Medical Inc.
DIAGNOSTIC     St Andre      1         673460         385657   571169   751435  3
0
WIRE .035
260cm J wire
(236153)
ACIST Hand     Acist        1         86396          009041   605715   670322  5
Control        Medical
(43318)        Systems Inc
ACIST Manifold Acist        1         31608          984148   106752   362926  5
(83411)        Medical
Systems Inc
Tegaderm 4 x 4 3M           1         1626W          653023   904233   604518  5
(1626W)
MBrace Wrist   Advanced     1         140-0250-00    523707   68530    589220  5
Support        Vascular
(442566140)    Dynamics
SHEATH 6FR     Terumo       1         [id number]     670816   921081   624185  5
Slender
()
DIAGNOSTIC     Terumo       1                 886636   086194   351181  5
Tiger 110cm 5
Fr catheter
(475277)
GUIDE 6FR      Cardinal     1         [id number]       758211   419694   640733  3
XBLAD 4.0      Health
catheter
(74039770)
Volcano        Maypearl      1         41426H         145591   195779   182577  8
Platinum
Eagleye IVUS
Catheter
(74405V)
INFLATOR Merit Merit        1         BV2687         640726   127350   016432  1
5
[employer]    Medical
(VI2796)
CHOICE PT      [place]       1         [id number]  190133   412265   509102  5
Extra Support  Scientific
182cm wire
(3454726T4)
DANDRE RX 3.5 x  Medtronic    1         [id number]   350215   8154709  646110  5
       [id number]
22 stent
([id number])
EUPHORA 2.0 x  Medtronic    1         [id number]       987293   169825   946313  5
       [id number]
15 Balloon
([id number])
DANDRE RX 2.5 x  Medtronic    1         [id number]   272255   3795265  864831  5
       [id number]
18 stent
([id number])
TR BAND        Terumo       1         [id number]-CPM      065568   818085   841900  4
0
Standard
(CAB80KLM)
Signature Audit Bowman
 
Stage           Time        Signature      Unsigned
Intra-Procedure 4/15/2019   Citlaly Bhat
12:55:43 PM RT(R)          RT(R) 4/15/2019
12:57:15 PM
Intra-Procedure 4/15/2019   Citlaly Bhat
12:59:27 PM RT(R)
Signatures
Monitor : Citlaly Bhat Signature :
RT                       ______________________________
Date : ______________ Time :
______________
 
 
 
 
 
 
 
 
 
 
 
 
 
 
 
 
 
 
 
 
 
 
 
 
 
 
 
 
 
 
 
 
 
 
 
 
 
 
 
 
 
 
 
 
[facility]                                          
[address]                           
[place] [address]

## 2019-04-17 NOTE — OP
PATIENT NAME:  JOSE ESTRADA                      MEDICAL RECORD: [id number]
:57                                             LOCATION:D.CAT          
                                                         ADMISSION DATE:        
SURGEON:  JOHNNA FERNANDES MD             
 
 
DATE OF OPERATION:  04/15/2019
 
PROCEDURES:
1.  PTCA stent left circumflex, ramus intermedius.
2.  PTCA stent LAD.
3.  Left heart catheterization.
4.  Selective coronary angiography.
5.  Left ventriculogram.
6.  Intravascular ultrasound.
 
INDICATION:  Angina and coronary artery disease.
 
PROCEDURE IN DETAIL:  After informed consent was obtained and after a detailed
description of the risks, benefits as well as alternative therapies, the patient
elected to proceed with angiogram and angioplasty.  The right radial area was
prepped and draped in normal sterile fashion.  Right radial artery was
cannulated via modified Seldinger technique with placement of 6-Vietnamese sheath. 
All catheters exchanged through this sheath.
 
FINDINGS:  The left ventriculogram was performed in standard 30-degree GOODE view,
reveals good cardiac wall motion throughout all segments.  Overall ejection
fraction estimated 60%.
 
SELECTIVE CORONARY ANGIOGRAPHY:
1.  Left main is with no significant angiographic disease.
2.  Left anterior descending has greater than 80% stenosis proximally confirmed
by intravascular ultrasound.
3.  Left circumflex has a large ramus intermedius with 95% stenosis.
4.  Right coronary has moderate irregularities, but no flow-limiting stenosis. 
Previously placed stents are widely patent.
 
PTCA STENT OF THE LAD:  The stent used was a 3.5 x 22 mm Ayush.  Result was 0%
residual stenosis.
 
PTCA STENT OF THE LEFT CIRCUMFLEX, RAMUS INTERMEDIUS:  The stent used was a 2.5
x 18 mm Ayush.  Result was 0% residual stenosis.
 
OVERALL IMPRESSION:  Successful PTCA stent of the LAD and circumflex going from
80% to 95% initial stenosis to 0% residual.
 
TRANSINT:[id number] Voice Confirmation ID: [id number] DOCUMENT ID: [id number]
                                           
                                           JOHNNA FERNANDES MD             
 
 
 
Electronically Signed by JOHNNA FERNANDES on 19 at 1439
CC:                                                             [phone]
DICTATION DATE: 04/15/19 1256     :     04/15/19 1919      DEP CLI 
                                                                      04/15/19
[facility]                                          
[address] [place] [address]